# Patient Record
Sex: MALE | Race: WHITE | Employment: FULL TIME | URBAN - METROPOLITAN AREA
[De-identification: names, ages, dates, MRNs, and addresses within clinical notes are randomized per-mention and may not be internally consistent; named-entity substitution may affect disease eponyms.]

---

## 2018-02-12 ENCOUNTER — HOSPITAL ENCOUNTER (OUTPATIENT)
Age: 33
Discharge: HOME | End: 2018-02-12
Attending: FAMILY MEDICINE
Payer: COMMERCIAL

## 2018-02-12 VITALS
WEIGHT: 210 LBS | RESPIRATION RATE: 18 BRPM | DIASTOLIC BLOOD PRESSURE: 74 MMHG | BODY MASS INDEX: 27.83 KG/M2 | HEIGHT: 73 IN | HEART RATE: 87 BPM | OXYGEN SATURATION: 100 % | TEMPERATURE: 98 F | SYSTOLIC BLOOD PRESSURE: 125 MMHG

## 2018-02-12 DIAGNOSIS — S39.012A BACK STRAIN, INITIAL ENCOUNTER: Primary | ICD-10-CM

## 2018-02-12 PROCEDURE — 99204 OFFICE O/P NEW MOD 45 MIN: CPT

## 2018-02-12 PROCEDURE — 99203 OFFICE O/P NEW LOW 30 MIN: CPT

## 2018-02-12 RX ORDER — CYCLOBENZAPRINE HCL 10 MG
10 TABLET ORAL 3 TIMES DAILY PRN
Qty: 20 TABLET | Refills: 0 | Status: SHIPPED | OUTPATIENT
Start: 2018-02-12 | End: 2018-02-19

## 2018-02-12 NOTE — ED PROVIDER NOTES
Patient Seen in: [address]    History   Patient presents with:  Back Pain (musculoskeletal)    Stated Complaint: back pain    HPI    Chief complaint: Back pain    History of present illness:   The patient complains of back prominent aortic pulsation  Back: Tender to palpation in  mid thoracic paraspinal region, no midline bony tenderness, no erythema, decreased range of motion secondary to pain and spasm  Straight leg tests: nl  Neuro: Patient is alert and oriented x3, able

## 2018-05-16 ENCOUNTER — HOSPITAL ENCOUNTER (OUTPATIENT)
Age: 33
Discharge: HOME | End: 2018-05-16
Attending: FAMILY MEDICINE
Payer: COMMERCIAL

## 2018-05-16 VITALS
BODY MASS INDEX: 27.83 KG/M2 | OXYGEN SATURATION: 100 % | WEIGHT: 210 LBS | HEART RATE: 69 BPM | RESPIRATION RATE: 18 BRPM | SYSTOLIC BLOOD PRESSURE: 139 MMHG | TEMPERATURE: 98 F | HEIGHT: 73 IN | DIASTOLIC BLOOD PRESSURE: 84 MMHG

## 2018-05-16 DIAGNOSIS — S91.115A LACERATION OF LESSER TOE OF LEFT FOOT WITHOUT FOREIGN BODY PRESENT OR DAMAGE TO NAIL, INITIAL ENCOUNTER: Primary | ICD-10-CM

## 2018-05-16 PROCEDURE — 12001 RPR S/N/AX/GEN/TRNK 2.5CM/<: CPT

## 2018-05-16 PROCEDURE — 99213 OFFICE O/P EST LOW 20 MIN: CPT

## 2018-05-16 PROCEDURE — 99212 OFFICE O/P EST SF 10 MIN: CPT

## 2018-05-16 RX ORDER — GINSENG 100 MG
CAPSULE ORAL ONCE
Status: COMPLETED | OUTPATIENT
Start: 2018-05-16 | End: 2018-05-16

## 2018-05-16 NOTE — ED NOTES
Patient has a 10mm long open wound on the bottom of his left foot at the base of his little toe. He states he stepped on a cooler and cut his foot. Wound cleansed with wound cleanser and flushed with normal saline. No active bleeding noted at this time.

## 2018-05-16 NOTE — ED PROVIDER NOTES
Patient Seen in: [address]    History   Patient presents with:  Laceration Abrasion (integumentary)    Stated Complaint: lt foot laceration    HPI    Patient was walking down the stairs in his home earlier today and  ex extending to the base of the left fifth toe. scant bleed, controlled with pressure. Wound appears clean. No foreign body. Edges are gaping. Psychiatric: He has a normal mood and affect. His behavior is normal.   Nursing note and vitals reviewed.

## 2020-03-03 ENCOUNTER — HOSPITAL ENCOUNTER (OUTPATIENT)
Age: 35
Discharge: HOME | End: 2020-03-03
Attending: EMERGENCY MEDICINE
Payer: COMMERCIAL

## 2020-03-03 VITALS
HEIGHT: 73 IN | TEMPERATURE: 98 F | HEART RATE: 85 BPM | DIASTOLIC BLOOD PRESSURE: 72 MMHG | RESPIRATION RATE: 16 BRPM | OXYGEN SATURATION: 98 % | SYSTOLIC BLOOD PRESSURE: 127 MMHG | BODY MASS INDEX: 27.83 KG/M2 | WEIGHT: 210 LBS

## 2020-03-03 DIAGNOSIS — J06.9 UPPER RESPIRATORY TRACT INFECTION, UNSPECIFIED TYPE: Primary | ICD-10-CM

## 2020-03-03 LAB
POCT INFLUENZA A: NEGATIVE
POCT INFLUENZA B: NEGATIVE

## 2020-03-03 PROCEDURE — 99213 OFFICE O/P EST LOW 20 MIN: CPT

## 2020-03-03 PROCEDURE — 87502 INFLUENZA DNA AMP PROBE: CPT | Performed by: EMERGENCY MEDICINE

## 2020-03-03 NOTE — ED PROVIDER NOTES
Patient Seen in: [address]      History   Patient presents with:  Cough/URI    Stated Complaint: ALEJANDRA Regan Erm    HPI    Patient is a 17-year-old male his wife was diagnosed with influenza A this morning. round, and reactive to light. Neck: Neck supple. Cardiovascular: Normal rate, regular rhythm, normal heart sounds and intact distal pulses. Pulmonary/Chest: Effort normal and breath sounds normal. No respiratory distress. Abdominal: Soft.  Bowel so

## 2020-03-03 NOTE — ED INITIAL ASSESSMENT (HPI)
Pt presents to the IC with c/o possible cough, cold and chills. Pt's wife was dx with influenza A today.

## 2020-06-24 ENCOUNTER — ORDER TRANSCRIPTION (OUTPATIENT)
Dept: ADMINISTRATIVE | Facility: HOSPITAL | Age: 35
End: 2020-06-24

## 2020-06-24 DIAGNOSIS — Z01.818 OTHER SPECIFIED PRE-OPERATIVE EXAMINATION: Primary | ICD-10-CM

## 2020-06-24 DIAGNOSIS — Z11.59 ENCOUNTER FOR SCREENING FOR OTHER VIRAL DISEASES: ICD-10-CM

## 2020-07-18 ENCOUNTER — LAB ENCOUNTER (OUTPATIENT)
Dept: LAB | Facility: HOSPITAL | Age: 35
End: 2020-07-18
Attending: FAMILY MEDICINE
Payer: COMMERCIAL

## 2020-07-18 DIAGNOSIS — Z11.59 ENCOUNTER FOR SCREENING FOR OTHER VIRAL DISEASES: ICD-10-CM

## 2020-07-18 DIAGNOSIS — Z01.818 OTHER SPECIFIED PRE-OPERATIVE EXAMINATION: ICD-10-CM

## 2020-07-19 LAB — SARS-COV-2 RNA RESP QL NAA+PROBE: NOT DETECTED

## 2020-10-01 ENCOUNTER — APPOINTMENT (OUTPATIENT)
Dept: GENERAL RADIOLOGY | Age: 35
End: 2020-10-01
Attending: PHYSICIAN ASSISTANT
Payer: COMMERCIAL

## 2020-10-01 ENCOUNTER — HOSPITAL ENCOUNTER (OUTPATIENT)
Age: 35
Discharge: HOME | End: 2020-10-01
Payer: COMMERCIAL

## 2020-10-01 VITALS
HEIGHT: 73 IN | DIASTOLIC BLOOD PRESSURE: 71 MMHG | HEART RATE: 69 BPM | WEIGHT: 210 LBS | TEMPERATURE: 98 F | OXYGEN SATURATION: 100 % | SYSTOLIC BLOOD PRESSURE: 117 MMHG | RESPIRATION RATE: 16 BRPM | BODY MASS INDEX: 27.83 KG/M2

## 2020-10-01 DIAGNOSIS — M89.8X1 PAIN OF LEFT SCAPULA: Primary | ICD-10-CM

## 2020-10-01 PROCEDURE — 71046 X-RAY EXAM CHEST 2 VIEWS: CPT | Performed by: PHYSICIAN ASSISTANT

## 2020-10-01 PROCEDURE — 96372 THER/PROPH/DIAG INJ SC/IM: CPT | Performed by: PHYSICIAN ASSISTANT

## 2020-10-01 PROCEDURE — 99203 OFFICE O/P NEW LOW 30 MIN: CPT | Performed by: PHYSICIAN ASSISTANT

## 2020-10-01 RX ORDER — METAXALONE 800 MG/1
400 TABLET ORAL 2 TIMES DAILY
Qty: 7 TABLET | Refills: 0 | Status: SHIPPED | OUTPATIENT
Start: 2020-10-01 | End: 2020-10-08

## 2020-10-01 RX ORDER — LIDOCAINE 50 MG/G
1 PATCH TOPICAL EVERY 24 HOURS
Qty: 10 PATCH | Refills: 0 | Status: SHIPPED | OUTPATIENT
Start: 2020-10-01 | End: 2020-10-11

## 2020-10-01 RX ORDER — KETOROLAC TROMETHAMINE 30 MG/ML
60 INJECTION, SOLUTION INTRAMUSCULAR; INTRAVENOUS ONCE
Status: COMPLETED | OUTPATIENT
Start: 2020-10-01 | End: 2020-10-01

## 2020-10-01 NOTE — ED PROVIDER NOTES
Patient Seen in: [address]      History   Patient presents with:  Back Pain    Stated Complaint: back pain    HPI    29 yo male with no PMH here for evaluation of L upper back pain.   Pt states he noticed the pain upon w Musculoskeletal: Normal range of motion. Cardiovascular:      Rate and Rhythm: Normal rate. Pulmonary:      Effort: Pulmonary effort is normal.   Abdominal:      General: Abdomen is flat. Musculoskeletal: Normal range of motion.         Arms:    Sk

## 2022-01-10 ENCOUNTER — HOSPITAL ENCOUNTER (OUTPATIENT)
Age: 37
Discharge: HOME | End: 2022-01-10
Payer: COMMERCIAL

## 2022-01-10 DIAGNOSIS — Z20.822 ENCOUNTER FOR LABORATORY TESTING FOR COVID-19 VIRUS: Primary | ICD-10-CM

## 2022-01-13 LAB — SARS-COV-2 RNA RESP QL NAA+PROBE: NOT DETECTED

## 2023-01-02 ENCOUNTER — HOSPITAL ENCOUNTER (OUTPATIENT)
Age: 38
Discharge: HOME | End: 2023-01-02
Payer: COMMERCIAL

## 2023-01-02 VITALS
HEART RATE: 94 BPM | TEMPERATURE: 97 F | OXYGEN SATURATION: 100 % | DIASTOLIC BLOOD PRESSURE: 86 MMHG | SYSTOLIC BLOOD PRESSURE: 137 MMHG | RESPIRATION RATE: 18 BRPM

## 2023-01-02 DIAGNOSIS — N30.90 CYSTITIS: Primary | ICD-10-CM

## 2023-01-02 DIAGNOSIS — R31.9 HEMATURIA, UNSPECIFIED TYPE: ICD-10-CM

## 2023-01-02 LAB
GLUCOSE UR STRIP-MCNC: NEGATIVE MG/DL
KETONES UR STRIP-MCNC: NEGATIVE MG/DL
NITRITE UR QL STRIP: NEGATIVE
PH UR STRIP: 7 [PH]
PROT UR STRIP-MCNC: 100 MG/DL
SP GR UR STRIP: 1.01
UROBILINOGEN UR STRIP-ACNC: <2 MG/DL

## 2023-01-02 PROCEDURE — 99213 OFFICE O/P EST LOW 20 MIN: CPT | Performed by: NURSE PRACTITIONER

## 2023-01-02 PROCEDURE — 81002 URINALYSIS NONAUTO W/O SCOPE: CPT | Performed by: NURSE PRACTITIONER

## 2023-01-02 RX ORDER — CEFUROXIME AXETIL 500 MG/1
500 TABLET ORAL 2 TIMES DAILY
Qty: 20 TABLET | Refills: 0 | Status: SHIPPED | OUTPATIENT
Start: 2023-01-02 | End: 2023-01-12

## 2023-01-02 RX ORDER — PHENAZOPYRIDINE HYDROCHLORIDE 200 MG/1
200 TABLET, FILM COATED ORAL 3 TIMES DAILY PRN
Qty: 6 TABLET | Refills: 0 | Status: SHIPPED | OUTPATIENT
Start: 2023-01-02 | End: 2023-01-09

## 2023-01-03 LAB
C TRACH DNA SPEC QL NAA+PROBE: NEGATIVE
N GONORRHOEA DNA SPEC QL NAA+PROBE: NEGATIVE

## (undated) NOTE — LETTER
[address] [place], [place] [id number]  Dept: [phone]  Dept Fax: [phone]      February 12, 2018    Patient: Macario Jensen   Date of Visit: 2/12/2018       To Whom It May Concern:    Macario montejo

## (undated) NOTE — LETTER
Date & Time: 5/16/2018, 11:28 AM  Patient: So Laughlin  Encounter Provider(s):    Matt Lu MD       To Whom It May Concern:    So Laughlin was seen and treated in our department on 5/16/2018. He can return to work on 5/19/18.

## (undated) NOTE — LETTER
Date & Time: 10/1/2020, 8:53 AM  Patient: Africa Villasenor  Encounter Provider(s):    Anil Merchant PA-C       To Whom It May Concern:    Africa Villasenor was seen and treated in our department on 10/1/2020. He can return to work 10/5/20 .     If